# Patient Record
Sex: FEMALE | Race: WHITE | NOT HISPANIC OR LATINO | Employment: OTHER | URBAN - METROPOLITAN AREA
[De-identification: names, ages, dates, MRNs, and addresses within clinical notes are randomized per-mention and may not be internally consistent; named-entity substitution may affect disease eponyms.]

---

## 2022-02-09 ENCOUNTER — APPOINTMENT (OUTPATIENT)
Dept: CT IMAGING | Facility: CLINIC | Age: 72
End: 2022-02-09
Attending: EMERGENCY MEDICINE
Payer: MEDICARE

## 2022-02-09 ENCOUNTER — HOSPITAL ENCOUNTER (EMERGENCY)
Facility: CLINIC | Age: 72
Discharge: HOME | End: 2022-02-09
Attending: PHYSICIAN ASSISTANT | Admitting: PHYSICIAN ASSISTANT
Payer: MEDICARE

## 2022-02-09 VITALS
SYSTOLIC BLOOD PRESSURE: 144 MMHG | OXYGEN SATURATION: 97 % | DIASTOLIC BLOOD PRESSURE: 98 MMHG | HEART RATE: 51 BPM | TEMPERATURE: 97.6 F | RESPIRATION RATE: 16 BRPM

## 2022-02-09 DIAGNOSIS — S09.90XA INJURY OF HEAD, INITIAL ENCOUNTER: ICD-10-CM

## 2022-02-09 DIAGNOSIS — Z79.01 LONG TERM CURRENT USE OF ANTICOAGULANT THERAPY: ICD-10-CM

## 2022-02-09 PROCEDURE — 99284 EMERGENCY DEPT VISIT MOD MDM: CPT | Mod: 25

## 2022-02-09 PROCEDURE — 72125 CT NECK SPINE W/O DYE: CPT | Mod: ME

## 2022-02-09 PROCEDURE — G1004 CDSM NDSC: HCPCS

## 2022-02-09 ASSESSMENT — ENCOUNTER SYMPTOMS
VOMITING: 0
NECK PAIN: 0
HEADACHES: 1

## 2022-02-09 NOTE — ED TRIAGE NOTES
Patient presents with complaints of head injury. Patient state she slipped and fell getting out of the shower and fell striking her head on the tub. Denies any LOC. Patient is on blood thinners. Denies any cervical tenderness. ABC intact without need for intervention at this time.

## 2022-02-09 NOTE — ED PROVIDER NOTES
History   Chief Complaint:  Head Injury     The history is provided by the patient.      Britt Kurtz is a 71 year old female with history of paroxysmal atrial fibrillation on Xarelto who presents with head injury. The patient reports that she slipped getting out of the shower just prior to arrival and hit the right side of her head on the tub. She did not have chest pain prior to the fall and denies loss of consciousness. She has a very mild headache that she describes more as soreness. No vision changes, vomiting, or neck pain. She reports taking her Xarelto as prescribed with no missed doses recently.     Review of Systems   Eyes: Negative for visual disturbance.   Cardiovascular: Negative for chest pain.   Gastrointestinal: Negative for vomiting.   Musculoskeletal: Negative for neck pain.   Neurological: Positive for headaches. Negative for syncope.   All other systems reviewed and are negative.    Allergies:  Latex    Medications:  Albuterol inhaler  Lipitor  Coreg  Tiazac  Multaq  Prilosec  Xarelto  Betapace     Past Medical History:     Atrial fibrillation   Arthritis   Anxiety   Hypertension  GERD  Colon polyps  Hyperlipidemia  IBS  Obesity  ESTHER  TIA    Past Surgical History:    Left total knee arthroplasty  Bilateral carpal tunnel release  Hysterectomy  Anterior vaginal repair  Cystoscopy  Tubal ligation  Tonsillectomy and adenoidectomy   Colonoscopy  Bladder suspension  Cardiac ablation  Bilateral cataract extraction   Shoulder arthroscopy     Family History:    Alcohol abuse, brother  COPD, father  CAD, father  Breast cancer, mother  Diabetes, mother  Hyperlipidemia, mother/sister  Hypertension, mother/sister  Stroke, mother    Social History:  Presents to ED alone.   Lives in [place] and is visiting her daughter.    Physical Exam     Patient Vitals for the past 24 hrs:   BP Temp Pulse Resp SpO2   02/09/22 1130 (!) 158/78 -- 50 -- 97 %   02/09/22 1038 (!) 181/92 97.6  F (36.4  C) 59 16 98 %        Physical Exam  General: GCS 15  Head:  Scalp is atraumatic. Mild right parietal swelling and tenderness. No laceration or open wound. No step off.   Eyes:  EOM intact. The pupils are equal, round, and reactive to light. Right lateral, focal, subconjunctival hemorrhage (from eye injection recently)   ENT:                                      Ears:  The external ears are normal. TM's non-erythematous. External canals normal.    Nose:  The external nose is normal.  Throat:  The oropharynx is normal. Mucus membranes are moist.                 Neck:  Normal range of motion. There is no rigidity.   CV:  Regular rate and rhythm. No murmur. 2+ radial and DP pulses  Resp:  Breath sounds are clear bilaterally. Non-labored, no retractions or accessory muscle use.  GI:  Abdomen is soft, no distension, no tenderness.   MS:  Normal range of motion.   Skin:  Warm and dry.   Neuro:  Cranial nerves 2-12 intact; 5/5 strength throughout the upper and lower extremities; sensation intact to light touch throughout the upper and lower extremities; normal  coordination; normal gait,  Psych:  Awake. Alert & orientedx3.  Appropriate interactions.     Emergency Department Course     Imaging:  Head CT w/o contrast   Preliminary Result   IMPRESSION:   1. No CT findings of acute intracranial process.   2. 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   3. Brain atrophy and presumed chronic small vessel ischemic changes,   as described.      Cervical spine CT w/o contrast   Preliminary Result   IMPRESSION:   1. No acute fracture or posttraumatic malalignment of the cervical   spine.   2. Multilevel degenerative changes, as described.        Report per radiology    Emergency Department Course:     Reviewed:  I reviewed nursing notes, vitals, past medical history, Care Everywhere and [facility].    Assessments:  1123 I obtained history and examined the patient as noted above.   1200 I rechecked the patient and  explained findings.     Consults:  1138 I staffed the patient with Dr. Rob, ED physician.     Disposition:  The patient was discharged to home.     Impression & Plan     Medical Decision Making:  Britt Kurtz is a 71 year old female with medical history including atrial fibrillation on Coumadin presents to the emergency department with head injury. Please review HPI for further details. This was clearly a mechanical fall, not syncope.  There were no prodromal symptoms so I doubt stroke, cardiac arrhythmia, or other serious etiology.      Differential of head injury includes epidural hematoma, subdural hematoma, skull fracture, concussion intracerebral hemorrhage and traumatic subarachnoid hemorrhage. CT head unremarkable without evidence of hemorrhage. Discussed incidental finding of chronic appearing small infarct- she denies history of CVA, though notes she was diagnosed with TIA last spring. C-spine ordered from triage, this is unremarkable.      Plan for discharge home and close follow up with primary care provider as needed. Return for red flag symptoms including severe headache, confusion, vision changes, vomiting, or any other new/concerning symptoms. Patient and  agrees with the plan and all questions and concerns addressed prior to discharge home.     Diagnosis:    ICD-10-CM    1. Injury of head, initial encounter  S09.90XA    2. Long term current use of anticoagulant therapy  Z79.01      Scribe Disclosure:  I, Xiomara Agrawal, am serving as a scribe at 11:08 AM on 2/9/2022 to document services personally performed by Estelle Hernandez PA-C based on my observations and the provider's statements to me.      Estelle Hernandez PA-C  02/09/22 [id number]

## 2022-02-09 NOTE — DISCHARGE INSTRUCTIONS
*Follow-up with your doctor for a recheck in 2-3 days as needed.   *Return if you develop worsening headache, recurrent vomiting, seizures, neurologic changes or become worse in any way.         Discharge Instructions  Head Injury    You have been seen today for a head injury. Your evaluation included a history and physical examination. You may have had a CT (CAT) scan performed, though most head injuries do not require a scan. Based on this evaluation, your provider today does not feel that your head injury is serious.    Generally, every Emergency Department visit should have a follow-up clinic visit with either a primary or a specialty clinic/provider. Please follow-up as instructed by your emergency provider today.  Return to the Emergency Department if:  You are confused or you are not acting right.  Your headache gets worse or you start to have a really bad headache even with your recommended treatment plan.  You vomit (throw up) more than once.  You have a seizure.  You have trouble walking.  You have weakness or paralysis (cannot move) in an arm or a leg.  You have blood or fluid coming from your ears or nose.  You have new symptoms or anything that worries you.    Sleeping:  It is okay for you to sleep, but someone should wake you up if instructed by your provider, and someone should check on you at your usual time to wake up.     Activity:  Do not drive for at least 24 hours.  Do not drive if you have dizzy spells or trouble concentrating, or remembering things.  Do not return to any contact sports until cleared by your regular provider.     MORE INFORMATION:    Concussion:  A concussion is a minor head injury that may cause temporary problems with the way the brain works. Although concussions are important, they are generally not an emergency or a reason that a person needs to be hospitalized. Some concussion symptoms include confusion, amnesia (forgetful), nausea (sick to your stomach) and vomiting  (throwing up), dizziness, fatigue, memory or concentration problems, irritability and sleep problems. For most people, concussions are mild and temporary but some will have more severe and persistent symptoms that require on-going care and treatment.  CT Scans: Your evaluation today may have included a CT scan (CAT scan) to look for things like bleeding or a skull fracture (broken bone).  CT scans involve radiation and too many CT scans can cause serious health problems like cancer, especially in children.  Because of this, your provider may not have ordered a CT scan today if they think you are at low risk for a serious or life threatening problem.    If you were given a prescription for medicine here today, be sure to read all of the information (including the package insert) that comes with your prescription.  This will include important information about the medicine, its side effects, and any warnings that you need to know about.  The pharmacist who fills the prescription can provide more information and answer questions you may have about the medicine.  If you have questions or concerns that the pharmacist cannot address, please call or return to the Emergency Department.     Remember that you can always come back to the Emergency Department if you are not able to see your regular provider in the amount of time listed above, if you get any new symptoms, or if there is anything that worries you.

## 2022-12-23 ENCOUNTER — HOSPITAL ENCOUNTER (EMERGENCY)
Facility: CLINIC | Age: 72
Discharge: HOME | End: 2022-12-23
Attending: EMERGENCY MEDICINE | Admitting: EMERGENCY MEDICINE
Payer: MEDICARE

## 2022-12-23 ENCOUNTER — APPOINTMENT (OUTPATIENT)
Dept: GENERAL RADIOLOGY | Facility: CLINIC | Age: 72
End: 2022-12-23
Attending: EMERGENCY MEDICINE
Payer: MEDICARE

## 2022-12-23 ENCOUNTER — APPOINTMENT (OUTPATIENT)
Dept: CT IMAGING | Facility: CLINIC | Age: 72
End: 2022-12-23
Attending: EMERGENCY MEDICINE
Payer: MEDICARE

## 2022-12-23 VITALS
RESPIRATION RATE: 16 BRPM | HEIGHT: 62 IN | TEMPERATURE: 98.3 F | DIASTOLIC BLOOD PRESSURE: 78 MMHG | OXYGEN SATURATION: 98 % | HEART RATE: 51 BPM | SYSTOLIC BLOOD PRESSURE: 145 MMHG | BODY MASS INDEX: 35.33 KG/M2 | WEIGHT: 192 LBS

## 2022-12-23 DIAGNOSIS — M54.6 ACUTE MIDLINE THORACIC BACK PAIN: ICD-10-CM

## 2022-12-23 LAB
ALBUMIN SERPL BCG-MCNC: 4.1 G/DL (ref 3.5–5.2)
ALP SERPL-CCNC: 128 U/L (ref 35–104)
ALT SERPL W P-5'-P-CCNC: 17 U/L (ref 10–35)
ANION GAP SERPL CALCULATED.3IONS-SCNC: 10 MMOL/L (ref 7–15)
AST SERPL W P-5'-P-CCNC: 23 U/L (ref 10–35)
BASOPHILS # BLD AUTO: 0.1 10E3/UL (ref 0–0.2)
BASOPHILS NFR BLD AUTO: 1 %
BILIRUB SERPL-MCNC: 0.2 MG/DL
BUN SERPL-MCNC: 15 MG/DL (ref 8–23)
CALCIUM SERPL-MCNC: 9.1 MG/DL (ref 8.8–10.2)
CHLORIDE SERPL-SCNC: 100 MMOL/L (ref 98–107)
CREAT SERPL-MCNC: 0.71 MG/DL (ref 0.51–0.95)
DEPRECATED HCO3 PLAS-SCNC: 25 MMOL/L (ref 22–29)
EOSINOPHIL # BLD AUTO: 0.1 10E3/UL (ref 0–0.7)
EOSINOPHIL NFR BLD AUTO: 1 %
ERYTHROCYTE [DISTWIDTH] IN BLOOD BY AUTOMATED COUNT: 14.5 % (ref 10–15)
GFR SERPL CREATININE-BSD FRML MDRD: 90 ML/MIN/1.73M2
GLUCOSE SERPL-MCNC: 130 MG/DL (ref 70–99)
HCT VFR BLD AUTO: 40.7 % (ref 35–47)
HGB BLD-MCNC: 13.1 G/DL (ref 11.7–15.7)
HOLD SPECIMEN: NORMAL
HOLD SPECIMEN: NORMAL
IMM GRANULOCYTES # BLD: 0 10E3/UL
IMM GRANULOCYTES NFR BLD: 0 %
LIPASE SERPL-CCNC: 42 U/L (ref 13–60)
LYMPHOCYTES # BLD AUTO: 1.4 10E3/UL (ref 0.8–5.3)
LYMPHOCYTES NFR BLD AUTO: 22 %
MCH RBC QN AUTO: 28.2 PG (ref 26.5–33)
MCHC RBC AUTO-ENTMCNC: 32.2 G/DL (ref 31.5–36.5)
MCV RBC AUTO: 88 FL (ref 78–100)
MONOCYTES # BLD AUTO: 0.7 10E3/UL (ref 0–1.3)
MONOCYTES NFR BLD AUTO: 11 %
NEUTROPHILS # BLD AUTO: 4.1 10E3/UL (ref 1.6–8.3)
NEUTROPHILS NFR BLD AUTO: 65 %
NRBC # BLD AUTO: 0 10E3/UL
NRBC BLD AUTO-RTO: 0 /100
PLATELET # BLD AUTO: 266 10E3/UL (ref 150–450)
POTASSIUM SERPL-SCNC: 4.3 MMOL/L (ref 3.4–5.3)
PROT SERPL-MCNC: 7.5 G/DL (ref 6.4–8.3)
RBC # BLD AUTO: 4.64 10E6/UL (ref 3.8–5.2)
SODIUM SERPL-SCNC: 135 MMOL/L (ref 136–145)
TROPONIN T SERPL HS-MCNC: 7 NG/L
TROPONIN T SERPL HS-MCNC: 8 NG/L
WBC # BLD AUTO: 6.4 10E3/UL (ref 4–11)

## 2022-12-23 PROCEDURE — 85025 COMPLETE CBC W/AUTO DIFF WBC: CPT | Performed by: EMERGENCY MEDICINE

## 2022-12-23 PROCEDURE — 250N000009 HC RX 250: Performed by: EMERGENCY MEDICINE

## 2022-12-23 PROCEDURE — 83690 ASSAY OF LIPASE: CPT | Performed by: EMERGENCY MEDICINE

## 2022-12-23 PROCEDURE — 82040 ASSAY OF SERUM ALBUMIN: CPT | Performed by: EMERGENCY MEDICINE

## 2022-12-23 PROCEDURE — 71046 X-RAY EXAM CHEST 2 VIEWS: CPT

## 2022-12-23 PROCEDURE — 36415 COLL VENOUS BLD VENIPUNCTURE: CPT | Performed by: EMERGENCY MEDICINE

## 2022-12-23 PROCEDURE — G1010 CDSM STANSON: HCPCS

## 2022-12-23 PROCEDURE — 84484 ASSAY OF TROPONIN QUANT: CPT | Performed by: EMERGENCY MEDICINE

## 2022-12-23 PROCEDURE — 80053 COMPREHEN METABOLIC PANEL: CPT | Performed by: EMERGENCY MEDICINE

## 2022-12-23 PROCEDURE — 99285 EMERGENCY DEPT VISIT HI MDM: CPT | Mod: 25

## 2022-12-23 PROCEDURE — 93005 ELECTROCARDIOGRAM TRACING: CPT

## 2022-12-23 PROCEDURE — 250N000011 HC RX IP 250 OP 636: Performed by: EMERGENCY MEDICINE

## 2022-12-23 PROCEDURE — 85014 HEMATOCRIT: CPT | Performed by: EMERGENCY MEDICINE

## 2022-12-23 RX ORDER — IOPAMIDOL 755 MG/ML
120 INJECTION, SOLUTION INTRAVASCULAR ONCE
Status: COMPLETED | OUTPATIENT
Start: 2022-12-23 | End: 2022-12-23

## 2022-12-23 RX ADMIN — SODIUM CHLORIDE 60 ML: 9 INJECTION, SOLUTION INTRAVENOUS at 20:29

## 2022-12-23 RX ADMIN — IOPAMIDOL 80 ML: 755 INJECTION, SOLUTION INTRAVENOUS at 20:28

## 2022-12-23 ASSESSMENT — ACTIVITIES OF DAILY LIVING (ADL)
ADLS_ACUITY_SCORE: 35
ADLS_ACUITY_SCORE: 35

## 2022-12-23 NOTE — ED TRIAGE NOTES
Pt. Presents to ED from  for evaluation of sharp pain in the center of her back that radiates to her R shoulder and R jaw. Took 3 baby ASA and about 30 mins later it resolved, then pain returned so she took a whole ASA. Pt. Reports she has been pain free since the second full ASA> Hypertensive, OVSS on RA. Takes sotalol and xarelto for Afib. EKG at  was sinus kelsea  with sinus arrythmia.

## 2022-12-24 NOTE — DISCHARGE INSTRUCTIONS
We have done work-up for both cardiac and pulmonary and aortic cause for back pain and these are all normal.  As we have discussed even your hiatal hernia could give you pain that radiates to your jaw.  If you develop constant pain that does not resolve with aspirin or you develop back pain or chest pain or jaw pain that is worse when you exert yourself return to the emergency room for reassessment.  Thanks for your patience for all the test today and have a Pat Cook.

## 2022-12-24 NOTE — ED PROVIDER NOTES
History     Chief Complaint:    Back Pain, Shoulder Pain, and Jaw Pain      HPI   Britt Kurtz is a 72 year old female who presents with back and jaw pain.    Patient is a 72-year-old female who is here for [place].  Patient states that around 11:00 she developed back pain.  It localizes just below her shoulder blades.  Patient thought maybe she pulled a muscle but then it radiated into her jaw.  She denies diaphoresis shortness of breath or anterior abdominal pain.  Patient talked to a friend and thought she should be evaluated due to the radiation to the jaw and presents the emergency room for assessment.  Patient is on Xarelto due to history of A. fib.  She denies calf pain or swelling or history of prior blood clots.  No shortness of breath.  Patient took aspirin due to the radiation to the jaw and since pain is resolved.    Review of Systems  No chest pain with exertion no cough or fever symptoms have improved with aspirin.  All the systems negative except as above    Allergies:    Latex      Medications:      Biotin 5000 MCG CAPS  dhea 25 MG TABS  estradiol (CLIMARA) 0.05 MG/24HR  Multiple Vitamins-Minerals (PRESERVISION AREDS PO)  multivitamin, therapeutic with minerals (MULTI-VITAMIN) TABS  order for DME  oxyCODONE-acetaminophen (PERCOCET) 5-325 MG per tablet  Potassium 95 MG TABS  PROGESTERONE MICRONIZED PO  Rivaroxaban (XARELTO PO)  SOTALOL HCL PO  VITAMIN D, CHOLECALCIFEROL, PO  VITAMIN E BLEND PO        Past Medical History:      Past Medical History:   Diagnosis Date     Arrhythmia      Arthritis      Other chronic pain      Patient Active Problem List    Diagnosis Date Noted     S/P total knee arthroplasty 08/01/2016     Priority: Medium        Past Surgical History:      Past Surgical History:   Procedure Laterality Date     ARTHROPLASTY KNEE Left 8/1/2016    Procedure: ARTHROPLASTY KNEE;  Surgeon: John Hurley MD;  Location: [facility] OR     CARPAL TUNNEL RELEASE RT/LT Left       "HYSTERECTOMY VAGINAL         Family History:      No family history on file.    Social History:    No Ref-Primary, Physician  The patient presents to the ED alone    Physical Exam     Patient Vitals for the past 24 hrs:   BP Temp Temp src Pulse Resp SpO2 Height Weight   12/23/22 1930 (!) 164/99 -- -- 60 20 96 % -- --   12/23/22 1915 (!) 167/105 -- -- 57 -- 99 % -- --   12/23/22 1736 (!) 170/87 98.3  F (36.8  C) Temporal 57 16 96 % 1.575 m (5' 2\") 87.1 kg (192 lb)       Physical Exam  Vitals and nursing note reviewed.   HENT:      Head: Normocephalic.      Mouth/Throat:      Mouth: Mucous membranes are moist.   Eyes:      Pupils: Pupils are equal, round, and reactive to light.   Cardiovascular:      Rate and Rhythm: Normal rate and regular rhythm.   Pulmonary:      Effort: Pulmonary effort is normal.      Breath sounds: Normal breath sounds.   Abdominal:      General: Abdomen is flat. Bowel sounds are normal.      Palpations: Abdomen is soft.   Musculoskeletal:        Back:    Neurological:      Mental Status: She is alert.         Emergency Department Course   ECG:  ECG taken at 1727, ECG read at 1800     Rate 58 bpm. MS interval 162 ms. QRS duration 80 ms. QT/QTc 408/400 ms. SINUS BRADYCARDIA      No results found for this or any previous visit.    Imaging:  CT Aortic Survey w Contrast   Final Result   IMPRESSION:   1.  No evidence of thoracoabdominal aortic aneurysm or dissection.   2.  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   3.  Moderate hiatal hernia.   4.  6 mm left lower lobe pulmonary nodule. 6-12 month follow-up recommended per Fleischner Society guidelines.            REFERENCE:   Guidelines for Management of Incidental Pulmonary Nodules Detected on CT Images: From the Fleischner Society 2017.    Guidelines apply to incidental nodules in patients who are 35 years or older.   Guidelines do not apply to lung cancer screening, patients with immunosuppression, or patients with known primary cancer.      SINGLE " NODULE      Nodule size 6-8 mm   Low-risk patients: Follow-up CT at 6-12 months, then consider CT at 18-24 months.   High-risk patients: Follow-up CT at 6-12 months, then at 18-24 months if no change.      XR Chest 2 Views   Final Result   IMPRESSION: Heart size is normal for technique. Thoracic aorta is tortuous with calcifications in the arch. Lungs are clear of CHF, lobar consolidation, or effusion. No acute bony abnormalities.        Report per radiology    Laboratory:  Labs Ordered and Resulted from Time of ED Arrival to Time of ED Departure   COMPREHENSIVE METABOLIC PANEL - Abnormal       Result Value    Sodium 135 (*)     Potassium 4.3      Chloride 100      Carbon Dioxide (CO2) 25      Anion Gap 10      Urea Nitrogen 15.0      Creatinine 0.71      Calcium 9.1      Glucose 130 (*)     Alkaline Phosphatase 128 (*)     AST 23      ALT 17      Protein Total 7.5      Albumin 4.1      Bilirubin Total 0.2      GFR Estimate 90     TROPONIN T, HIGH SENSITIVITY - Normal    Troponin T, High Sensitivity 8     LIPASE - Normal    Lipase 42     TROPONIN T, HIGH SENSITIVITY - Normal    Troponin T, High Sensitivity 7     CBC WITH PLATELETS AND DIFFERENTIAL    WBC Count 6.4      RBC Count 4.64      Hemoglobin 13.1      Hematocrit 40.7      MCV 88      MCH 28.2      MCHC 32.2      RDW 14.5      Platelet Count 266      % Neutrophils 65      % Lymphocytes 22      % Monocytes 11      % Eosinophils 1      % Basophils 1      % Immature Granulocytes 0      NRBCs per 100 WBC 0      Absolute Neutrophils 4.1      Absolute Lymphocytes 1.4      Absolute Monocytes 0.7      Absolute Eosinophils 0.1      Absolute Basophils 0.1      Absolute Immature Granulocytes 0.0      Absolute NRBCs 0.0             Emergency Department Course:             Reviewed:    I reviewed nursing notes, vitals and Care Everywhere    Assessments:   I obtained history and examined the patient as noted above.    I rechecked the patient and explained findings.        Consults:            Interventions:    Medications   Saline CT scan flush (60 mLs Intravenous Given 12/23/22 2029)   iopamidol (ISOVUE-370) solution 120 mL (80 mLs Intravenous Given 12/23/22 2028)       Disposition:  The patient was discharged to home.     Impression & Plan        Medical Decision Making:  Patient presents with sudden onset back pain that radiates to her jaw.  Patient took aspirin and symptoms seem to have improved patient also on anticoagulation.  Doubt PE.  Clinical concerns for coronary artery disease EKG and troponins x2 are negative.  Pain resolved with aspirin.  I did consider dissection based on the back pain only rating to her jaw differential diagnosis includes descending doubt aortic dissection and therefore CT angio was performed and negative.  Patient was offered reassurance that no primary signs of primary cardiac or primary aortic event.  Patient is offered follow-up with primary care no interventions for pain were required in the emergency room and was discharged in stable condition.      Covid-19  Britt Kurtz was evaluated during a global COVID-19 pandemic, which necessitated consideration that the patient might be at risk for infection with the SARS-CoV-2 virus that causes COVID-19.   Applicable protocols for evaluation were followed during the patient's care.   COVID-19 was considered as part of the patient's evaluation.    Diagnosis:    ICD-10-CM    1. Acute midline thoracic back pain  M54.6           Discharge Medications:  Discharge Medication List as of 12/23/2022  9:24 PM            Scribe Disclosure:  I, Julio Cavazos MD, am serving as a scribe at 7:57 PM on 12/23/2022 to document services personally performed by Julio Cavazos MD based on my observations and the provider's statements to me.      Julio Cavazos MD  12/27/22 0232

## 2022-12-24 NOTE — ED PROVIDER NOTES
PIT/Triage Evaluation    Patient presented with patient reports at 11 or 12 PM today she had onset of back pressure into her right shoulder into her right jaw.  She took 3 baby aspirin at that time.  She then took a whole aspirin about an hour later.  The pain resolved by 1 PM.  She denies any exertional symptoms, shortness of breath, diaphoresis, nausea, vomiting.  No numbness weakness or tingling.  She has history of atrial fibrillation on Xarelto and sotalol.  No missed doses of Xarelto.  No history of blood clots.  No leg pain or swelling.  No falls or trauma.  No other cardiac history.    Exam is notable for symmetric pulses, 2+ radial, 2+ posterior tibial.  Sensation intact to light touch.  Nontender posterior calves.  Regular rate and rhythm.  No murmurs rubs or gallops.  Clear lungs to auscultation.    Appropriate interventions for symptom management were initiated if applicable.  Appropriate diagnostic tests were initiated if indicated.    Important information for subsequent clinician back pain into jaw pain.  Resolved by 1 PM.  No prior cardiac history outside of atrial fibs, compliant with Xarelto.  Low risk for PE.  Considered dissection but symmetric pulses, no tearing pain, no numbness or tingling.  Plan for chest x-ray, labs and likely delta troponin.    I briefly evaluated the patient and developed an initial plan of care. I discussed this plan and explained that this brief interaction does not constitute a full evaluation. Patient/family understands that they should wait to be fully evaluated and discuss any test results with another clinician prior to leaving the hospital.       Josie Springer MD  12/23/22 [id number]

## 2022-12-26 LAB
ATRIAL RATE - MUSE: 58 BPM
DIASTOLIC BLOOD PRESSURE - MUSE: NORMAL MMHG
INTERPRETATION ECG - MUSE: NORMAL
P AXIS - MUSE: 62 DEGREES
PR INTERVAL - MUSE: 162 MS
QRS DURATION - MUSE: 80 MS
QT - MUSE: 408 MS
QTC - MUSE: 400 MS
R AXIS - MUSE: 29 DEGREES
SYSTOLIC BLOOD PRESSURE - MUSE: NORMAL MMHG
T AXIS - MUSE: 40 DEGREES
VENTRICULAR RATE- MUSE: 58 BPM

## 2024-02-26 ENCOUNTER — HOSPITAL ENCOUNTER (EMERGENCY)
Facility: CLINIC | Age: 74
Discharge: HOME | End: 2024-02-26
Attending: EMERGENCY MEDICINE | Admitting: EMERGENCY MEDICINE
Payer: MEDICARE

## 2024-02-26 VITALS
BODY MASS INDEX: 36.25 KG/M2 | WEIGHT: 192 LBS | RESPIRATION RATE: 28 BRPM | SYSTOLIC BLOOD PRESSURE: 139 MMHG | TEMPERATURE: 97 F | DIASTOLIC BLOOD PRESSURE: 75 MMHG | HEART RATE: 56 BPM | OXYGEN SATURATION: 96 % | HEIGHT: 61 IN

## 2024-02-26 DIAGNOSIS — R68.84 JAW PAIN: ICD-10-CM

## 2024-02-26 LAB
ANION GAP SERPL CALCULATED.3IONS-SCNC: 12 MMOL/L (ref 7–15)
ATRIAL RATE - MUSE: 56 BPM
BASOPHILS # BLD AUTO: 0 10E3/UL (ref 0–0.2)
BASOPHILS NFR BLD AUTO: 1 %
BUN SERPL-MCNC: 15.3 MG/DL (ref 8–23)
CALCIUM SERPL-MCNC: 9.3 MG/DL (ref 8.8–10.2)
CHLORIDE SERPL-SCNC: 102 MMOL/L (ref 98–107)
CREAT SERPL-MCNC: 0.67 MG/DL (ref 0.51–0.95)
DEPRECATED HCO3 PLAS-SCNC: 25 MMOL/L (ref 22–29)
DIASTOLIC BLOOD PRESSURE - MUSE: NORMAL MMHG
EGFRCR SERPLBLD CKD-EPI 2021: >90 ML/MIN/1.73M2
EOSINOPHIL # BLD AUTO: 0.1 10E3/UL (ref 0–0.7)
EOSINOPHIL NFR BLD AUTO: 1 %
ERYTHROCYTE [DISTWIDTH] IN BLOOD BY AUTOMATED COUNT: 15.8 % (ref 10–15)
GLUCOSE SERPL-MCNC: 134 MG/DL (ref 70–99)
HCT VFR BLD AUTO: 42.9 % (ref 35–47)
HGB BLD-MCNC: 13.8 G/DL (ref 11.7–15.7)
HOLD SPECIMEN: NORMAL
IMM GRANULOCYTES # BLD: 0 10E3/UL
IMM GRANULOCYTES NFR BLD: 1 %
INTERPRETATION ECG - MUSE: NORMAL
LYMPHOCYTES # BLD AUTO: 1.2 10E3/UL (ref 0.8–5.3)
LYMPHOCYTES NFR BLD AUTO: 18 %
MCH RBC QN AUTO: 27.2 PG (ref 26.5–33)
MCHC RBC AUTO-ENTMCNC: 32.2 G/DL (ref 31.5–36.5)
MCV RBC AUTO: 84 FL (ref 78–100)
MONOCYTES # BLD AUTO: 0.6 10E3/UL (ref 0–1.3)
MONOCYTES NFR BLD AUTO: 10 %
NEUTROPHILS # BLD AUTO: 4.5 10E3/UL (ref 1.6–8.3)
NEUTROPHILS NFR BLD AUTO: 69 %
NRBC # BLD AUTO: 0 10E3/UL
NRBC BLD AUTO-RTO: 0 /100
P AXIS - MUSE: 53 DEGREES
PLATELET # BLD AUTO: 271 10E3/UL (ref 150–450)
POTASSIUM SERPL-SCNC: 4.2 MMOL/L (ref 3.4–5.3)
PR INTERVAL - MUSE: 166 MS
QRS DURATION - MUSE: 84 MS
QT - MUSE: 432 MS
QTC - MUSE: 416 MS
R AXIS - MUSE: 46 DEGREES
RBC # BLD AUTO: 5.08 10E6/UL (ref 3.8–5.2)
SODIUM SERPL-SCNC: 139 MMOL/L (ref 135–145)
SYSTOLIC BLOOD PRESSURE - MUSE: NORMAL MMHG
T AXIS - MUSE: 59 DEGREES
TROPONIN T SERPL HS-MCNC: 10 NG/L
VENTRICULAR RATE- MUSE: 56 BPM
WBC # BLD AUTO: 6.4 10E3/UL (ref 4–11)

## 2024-02-26 PROCEDURE — 36415 COLL VENOUS BLD VENIPUNCTURE: CPT | Performed by: EMERGENCY MEDICINE

## 2024-02-26 PROCEDURE — 80048 BASIC METABOLIC PNL TOTAL CA: CPT | Performed by: EMERGENCY MEDICINE

## 2024-02-26 PROCEDURE — 93005 ELECTROCARDIOGRAM TRACING: CPT

## 2024-02-26 PROCEDURE — 84484 ASSAY OF TROPONIN QUANT: CPT | Performed by: EMERGENCY MEDICINE

## 2024-02-26 PROCEDURE — 85025 COMPLETE CBC W/AUTO DIFF WBC: CPT | Performed by: EMERGENCY MEDICINE

## 2024-02-26 PROCEDURE — 99284 EMERGENCY DEPT VISIT MOD MDM: CPT

## 2024-02-26 RX ORDER — PENICILLIN V POTASSIUM 500 MG/1
500 TABLET, FILM COATED ORAL 4 TIMES DAILY
Qty: 28 TABLET | Refills: 0 | Status: SHIPPED | OUTPATIENT
Start: 2024-02-26 | End: 2024-03-04

## 2024-02-26 ASSESSMENT — COLUMBIA-SUICIDE SEVERITY RATING SCALE - C-SSRS
1. IN THE PAST MONTH, HAVE YOU WISHED YOU WERE DEAD OR WISHED YOU COULD GO TO SLEEP AND NOT WAKE UP?: NO
2. HAVE YOU ACTUALLY HAD ANY THOUGHTS OF KILLING YOURSELF IN THE PAST MONTH?: NO
6. HAVE YOU EVER DONE ANYTHING, STARTED TO DO ANYTHING, OR PREPARED TO DO ANYTHING TO END YOUR LIFE?: NO

## 2024-02-26 ASSESSMENT — ACTIVITIES OF DAILY LIVING (ADL): ADLS_ACUITY_SCORE: 38

## 2024-02-26 NOTE — ED TRIAGE NOTES
Pt c/o right side jaw pain that started at 1600 yesterday. Pt reports the pain got better after taking ibuprofen yesterday, pain came back through the night. Pt has hx of atrial fib and reports she went in and out of it a few times last night. Pt denies chest pain. Has been feeling short of breath for a few days. Pt flew in from [place] last Sunday. Pt took 4 baby ASA at 0300

## 2024-02-26 NOTE — ED PROVIDER NOTES
"  History     Chief Complaint:  Jaw Pain       The history is provided by the patient.      Britt Kurtz is a 73 year old female. Anticoagulated on Xarelto, with a history of atrial fibrillation, hypertension, and TIA who present with constant right sided jaw pain. She initially noticed the pain yesterday evening, and took 4 doses of ibuprofen around the time of onset which provided some relief. However, the pain started worsening again around the time she went to bed, and continued to worsen throughout the night. She took 4 baby Asprin at some point during the night which no improvement of the pain. Additionally, she noticed that she went into atrial fibrillation a couple of times during that time. She is still having some jaw pain at bedside, and it originates at the top of her jaw and radiates down to the bottom. She rates the pain a 5/10. She mentions that she was brushing her teeth earlier this morning, and both the upper and bottom teeth were tender. Another symptom mentioned was some bilateral leg weakness. She denies chest pain, arm pain, or oral swelling.     Independent Historian:   None - Patient Only    Review of External Notes:   None     Medications:    Dhea   estradiol    Percocet   Xarelto   Sotalol HCL     Past Medical History:    Arrhythmia   Arthritis   Anxiety   A-fib   HTN   GERD   IBS   HLD   Obesity   TIA   Prediabetes     Past Surgical History:    L knee arthroplasty   L carpal tunnel release   Vaginal hysterectomy   Bladder repair   T & A   B/L cataract extraction   Unspecified distal clavicle excision and rotator cuff repair   Anterior vaginal repair   Tubal ligation   Colonoscopy w/ polypectomy     Physical Exam   Patient Vitals for the past 24 hrs:   BP Temp Temp src Pulse Resp SpO2 Height Weight   02/26/24 0717 123/71 -- -- 56 26 96 % -- --   02/26/24 0620 (!) 141/81 97  F (36.1  C) Temporal 56 18 100 % 1.549 m (5' 1\") 87.1 kg (192 lb)        Physical Exam  Constitutional: Alert, " attentive  HENT:    Nose normal.    Posterior pharynx shows no erythema or edema   Normal midline uvula   No peritonsillar erythema or swelling   No sublingual induration, swelling or tenderness   No trismus   Normal dentition except for apparent caries to the right upper and lower second molars, which are tender to palpation.  No significant gingival swelling adjacent to these or other teeth   Able to extend neck without discomfort   Tolerating secretions and able to breathe supine with ease  Eyes: EOM are normal. Pupils are equal, round, and reactive to light.   CV: regular rate and rhythm; no murmurs, rubs or gallups  Chest: Effort normal and breath sounds normal.   GI:  There is no tenderness. No distension. Normal bowel sounds  MSK: Normal range of motion.   Neurological: Alert, attentive  Skin: Skin is warm and dry.        Emergency Department Course   ECG  ECG results from 02/26/24   EKG 12-lead, tracing only     Value    Systolic Blood Pressure     Diastolic Blood Pressure     Ventricular Rate 56    Atrial Rate 56    ME Interval 166    QRS Duration 84        QTc 416    P Axis 53    R AXIS 46    T Axis 59    Interpretation ECG      Sinus bradycardia with sinus arrhythmia  Septal infarct , age undetermined  Abnormal ECG  When compared with ECG of 23-DEC-2022 17:27,  Septal infarct is now Present  Confirmed by - EMERGENCY ROOM, PHYSICIAN ([id number]),  REN PEREZ (Zayra) on 2/26/2024 6:44:52 AM       Laboratory:  Labs Ordered and Resulted from Time of ED Arrival to Time of ED Departure   BASIC METABOLIC PANEL - Abnormal       Result Value    Sodium 139      Potassium 4.2      Chloride 102      Carbon Dioxide (CO2) 25      Anion Gap 12      Urea Nitrogen 15.3      Creatinine 0.67      GFR Estimate >90      Calcium 9.3      Glucose 134 (*)    CBC WITH PLATELETS AND DIFFERENTIAL - Abnormal    WBC Count 6.4      RBC Count 5.08      Hemoglobin 13.8      Hematocrit 42.9      MCV 84      MCH 27.2      MCHC  32.2      RDW 15.8 (*)     Platelet Count 271      % Neutrophils 69      % Lymphocytes 18      % Monocytes 10      % Eosinophils 1      % Basophils 1      % Immature Granulocytes 1      NRBCs per 100 WBC 0      Absolute Neutrophils 4.5      Absolute Lymphocytes 1.2      Absolute Monocytes 0.6      Absolute Eosinophils 0.1      Absolute Basophils 0.0      Absolute Immature Granulocytes 0.0      Absolute NRBCs 0.0     TROPONIN T, HIGH SENSITIVITY - Normal    Troponin T, High Sensitivity 10          Emergency Department Course & Assessments:    Interventions:  Medications - No data to display     Assessments:  0637 I obtained history and examined the patient as noted above.   I rechecked the patient and explained findings. I discussed plan for discharge home.    Independent Interpretation (X-rays, CTs, rhythm strip):  None     Consultations/Discussion of Management or Tests:  None      Social Determinants of Health affecting care:   None    Disposition:  The patient was discharged.     Impression & Plan    CMS Diagnoses: None    Medical Decision Making:  This a pleasant 73-year-old female with history of A-fib on Xarelto, hypertension, TIA, who presents for evaluation of right-sided jaw pain for the last 24 to 48 hours, constant.  Patient is concerned regarding possible ACS.  Given longstanding and atypical pain, her negative EKG and troponin essentially rule out AMI.  Screening labs are otherwise reassuring.  Exam is most suggestive of likely dental abscess causing this pain.  I referred her to her dentist and will start penicillin therapy.  There is no evidence of peritonsillar abscess, Davonte angina, or more concerning ENT pathology.  She understands that she would need to return immediately for chest pain, shortness of breath, or other ACS equivalent symptoms.  Primary care follow-up for recheck in 3 to 5 days.      Diagnosis:    ICD-10-CM    1. Jaw pain  R68.84            Discharge Medications:  New Prescriptions     PENICILLIN V (VEETID) 500 MG TABLET    Take 1 tablet (500 mg) by mouth 4 times daily for 7 days        Scribe Disclosure:  I, Salvatore Rosado, am serving as a scribe at 7:36 AM on 2/26/2024 to document services personally performed by Andrew Astudillo MD based on my observations and the provider's statements to me.   2/26/2024   Andrew Astudillo MD [place], John Eric, MD  02/26/24 0741

## 2024-02-26 NOTE — DISCHARGE INSTRUCTIONS
It was a pleasure taking care of you today. I hope you feel much better soon.  Your workup showed no evidence of heart attack or similar problem.  Take Penicillin in case this represents an evolving dental infection, and follow-up with your dentist in 5-7 days.  Please follow-up with your primary care doctor in 1 week.  Return immediately for worse pain, chest pain, shortness of breath, or any other concerns.